# Patient Record
Sex: MALE
[De-identification: names, ages, dates, MRNs, and addresses within clinical notes are randomized per-mention and may not be internally consistent; named-entity substitution may affect disease eponyms.]

---

## 2023-02-27 ENCOUNTER — APPOINTMENT (OUTPATIENT)
Dept: VASCULAR SURGERY | Facility: CLINIC | Age: 48
End: 2023-02-27
Payer: COMMERCIAL

## 2023-02-27 VITALS
DIASTOLIC BLOOD PRESSURE: 80 MMHG | SYSTOLIC BLOOD PRESSURE: 171 MMHG | WEIGHT: 245 LBS | HEIGHT: 71 IN | BODY MASS INDEX: 34.3 KG/M2 | HEART RATE: 71 BPM

## 2023-02-27 DIAGNOSIS — Z86.59 PERSONAL HISTORY OF OTHER MENTAL AND BEHAVIORAL DISORDERS: ICD-10-CM

## 2023-02-27 DIAGNOSIS — R73.03 PREDIABETES.: ICD-10-CM

## 2023-02-27 DIAGNOSIS — I83.009 VARICOSE VEINS OF UNSPECIFIED LOWER EXTREMITY WITH ULCER OF UNSPECIFIED SITE: ICD-10-CM

## 2023-02-27 DIAGNOSIS — L97.909 VARICOSE VEINS OF UNSPECIFIED LOWER EXTREMITY WITH ULCER OF UNSPECIFIED SITE: ICD-10-CM

## 2023-02-27 DIAGNOSIS — S81.802A UNSPECIFIED OPEN WOUND, LEFT LOWER LEG, INITIAL ENCOUNTER: ICD-10-CM

## 2023-02-27 DIAGNOSIS — Z86.39 PERSONAL HISTORY OF OTHER ENDOCRINE, NUTRITIONAL AND METABOLIC DISEASE: ICD-10-CM

## 2023-02-27 PROBLEM — Z00.00 ENCOUNTER FOR PREVENTIVE HEALTH EXAMINATION: Status: ACTIVE | Noted: 2023-02-27

## 2023-02-27 PROCEDURE — 99204 OFFICE O/P NEW MOD 45 MIN: CPT

## 2023-02-27 RX ORDER — COLLAGENASE SANTYL 250 [ARB'U]/G
250 OINTMENT TOPICAL DAILY
Qty: 1 | Refills: 0 | Status: ACTIVE | COMMUNITY
Start: 2023-02-27 | End: 1900-01-01

## 2023-02-27 NOTE — ASSESSMENT
[FreeTextEntry1] : Assessment\par Mr. Peña Sandoval is a 47 year old man, overall healthy, with no significant vascular history presenting today for wound check of a LLE anterior shin wound. \par The patient has palpable pedal pulses. The wound is expected to heal appropriately in approximately 4-6 weeks. Instructed patient to apply Santyl ointment to the wound (prescription sent to pharmacy) daily and to continue local wound care for now. No gross signs of infection of the wound at this time, but instructed patient to continue to finish the course of antibiotics and to follow up sooner if he develops any signs or symptoms of infection, as pus, fevers, or chills.  [Ulcer Care] : ulcer care

## 2023-02-27 NOTE — PHYSICAL EXAM
[Normal Breath Sounds] : Normal breath sounds [Normal Heart Sounds] : normal heart sounds [2+] : left 2+ [JVD] : no jugular venous distention  [Varicose Veins Of Lower Extremities] : not present [] : not present [Abdomen Masses] : No abdominal masses [Abdomen Tenderness] : ~T ~M No abdominal tenderness [Tender] : was nontender [Alert] : alert [Calm] : calm [de-identified] : Pleasant, conversational, overweight man. No acute distress.  [FreeTextEntry1] : Some ankle edema of LLE (chronic) from prior ankle surgery. LLE with anterior shin wound, well healing, with tissue base consisting of mixed granulation tissue and scar tissue. Mild cellulitis/erythema surrounding the wound. No evidence of any exudate or pus drainage from the wound bed. Mild swelling of anterior shin area  approximately 2x5cm in size likely hematoma resolving.  [de-identified] : +FROM 5/5x4 [de-identified] : grossly intact

## 2023-02-27 NOTE — ADDENDUM
[FreeTextEntry1] : I, Dr.Timothy Hutton, personally performed the evaluation and management (E/M) services for this new patient.  That E/M includes conducting the initial examination, assessing all conditions, and establishing the plan of care.  Today, my ACP, Christa Engle PAC, was here to observe my evaluation and management services for this patient to be followed going forward.\par \par \par

## 2023-02-27 NOTE — HISTORY OF PRESENT ILLNESS
[FreeTextEntry1] : Mr. Peña Sandoval is a 47 year old man, overall healthy, with T2DM, HLD who is presenting today for wound check of a LLE anterior shin wound. He is accompanied by his wife. Per the patient, he was working on a home improvement project recently when he fell and hit his shin, causing a large injury to it. He immediately went to an urgent care where the urgent care doctor put several stitches to close the wound. Since then, he reports that the wound has been healing appropriately but that the urgent care doctor wanted him to be evaluated by a vascular doctor for wound healing capability. The patient reports that he has been started on an antibiotic course which he has been taking. He denies any fevers, chills, or pus or drainage from the wound. Overall, reports that he feels fine and that he just wants to make sure that the wound will heal appropriately.